# Patient Record
Sex: MALE | Race: AMERICAN INDIAN OR ALASKA NATIVE
[De-identification: names, ages, dates, MRNs, and addresses within clinical notes are randomized per-mention and may not be internally consistent; named-entity substitution may affect disease eponyms.]

---

## 2018-02-12 NOTE — CAT SCAN REPORT
CT PELVIS WITHOUT CONTRAST



History: Trauma.



Technique: Helical CT with sagittal and coronal reformatted images.



Findings: The lower lumbar spine, bony pelvis and proximal femurs are 

intact. No evidence for fracture or joint pathology. No pelvic 

diastasis.



The pelvic viscera are unremarkable. No pelvic fluid collection.



Impression: Normal pelvis.

## 2018-02-12 NOTE — XRAY REPORT
Right femur 2 views:



History: Lower extremity injury.



Findings:





Intratrabecular alignment linear radiolucency at the intertrochanteric 

region of right femur probably suggestive fracture. No periosteal 

reaction in the diaphysis. No fracture of the diaphysis.



Impression:



Hairline

Intratrabecular fracture intertrochanteric region.
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
## 2018-02-12 NOTE — CAT SCAN REPORT
FINAL REPORT



EXAM:  CT FACIAL BONES WO CON



HISTORY:  [place] in [place] 



TECHNIQUE:  CT of facial bones performed.



Axial images and coronal and sagittal reformatted images were

obtained.



PRIORS:  None.



FINDINGS:  

There is an acute depressed fracture of the left lamina papyracea

with associated opacification of left ethmoid air cells. There is

preseptal soft tissue swelling/contusion. I cannot confirm

intraorbital contusion. The globe appears intact. No other facial

fracture seen. There is minimal maxillary sinus mucosal

thickening.



IMPRESSION:  

Medially depressed fracture of left lamina papyracea with

associated opacified left ethmoid air cells.

## 2018-02-12 NOTE — XRAY REPORT
Right hip 2 views:



Findings:



The hip joint appears unremarkable. Intratrebecular hairline fracture 

at the intertrochanteric region right femur. Suspicion of joint 

effusion.



Impression:



Findings as described.

## 2019-11-23 ENCOUNTER — HOSPITAL ENCOUNTER (EMERGENCY)
Dept: HOSPITAL 5 - ED | Age: 39
Discharge: HOME | End: 2019-11-23
Payer: SELF-PAY

## 2019-11-23 VITALS — SYSTOLIC BLOOD PRESSURE: 151 MMHG | DIASTOLIC BLOOD PRESSURE: 90 MMHG

## 2019-11-23 DIAGNOSIS — R07.89: Primary | ICD-10-CM

## 2019-11-23 DIAGNOSIS — Z59.0: ICD-10-CM

## 2019-11-23 PROCEDURE — 93005 ELECTROCARDIOGRAM TRACING: CPT

## 2019-11-23 PROCEDURE — 93010 ELECTROCARDIOGRAM REPORT: CPT

## 2019-11-23 SDOH — ECONOMIC STABILITY - HOUSING INSECURITY: HOMELESSNESS: Z59.0
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
